# Patient Record
Sex: MALE | Race: BLACK OR AFRICAN AMERICAN | URBAN - METROPOLITAN AREA
[De-identification: names, ages, dates, MRNs, and addresses within clinical notes are randomized per-mention and may not be internally consistent; named-entity substitution may affect disease eponyms.]

---

## 2017-03-21 ENCOUNTER — OFFICE VISIT (OUTPATIENT)
Dept: INTERNAL MEDICINE CLINIC | Age: 57
End: 2017-03-21

## 2017-03-21 ENCOUNTER — DOCUMENTATION ONLY (OUTPATIENT)
Dept: INTERNAL MEDICINE CLINIC | Age: 57
End: 2017-03-21

## 2017-03-21 VITALS
DIASTOLIC BLOOD PRESSURE: 77 MMHG | BODY MASS INDEX: 27.92 KG/M2 | HEART RATE: 100 BPM | SYSTOLIC BLOOD PRESSURE: 110 MMHG | HEIGHT: 70 IN | TEMPERATURE: 99.1 F | WEIGHT: 195 LBS | OXYGEN SATURATION: 97 % | RESPIRATION RATE: 18 BRPM

## 2017-03-21 DIAGNOSIS — G89.29 CHRONIC PAIN OF RIGHT KNEE: ICD-10-CM

## 2017-03-21 DIAGNOSIS — G89.29 CHRONIC NECK PAIN: Primary | ICD-10-CM

## 2017-03-21 DIAGNOSIS — M54.41 CHRONIC BILATERAL LOW BACK PAIN WITH RIGHT-SIDED SCIATICA: ICD-10-CM

## 2017-03-21 DIAGNOSIS — R63.4 WEIGHT LOSS, UNINTENTIONAL: ICD-10-CM

## 2017-03-21 DIAGNOSIS — F17.200 TOBACCO USE DISORDER: ICD-10-CM

## 2017-03-21 DIAGNOSIS — M54.2 CHRONIC NECK PAIN: Primary | ICD-10-CM

## 2017-03-21 DIAGNOSIS — Z12.11 ENCOUNTER FOR SCREENING COLONOSCOPY: ICD-10-CM

## 2017-03-21 DIAGNOSIS — M25.561 CHRONIC PAIN OF RIGHT KNEE: ICD-10-CM

## 2017-03-21 DIAGNOSIS — F43.10 PTSD (POST-TRAUMATIC STRESS DISORDER): ICD-10-CM

## 2017-03-21 DIAGNOSIS — F41.8 DEPRESSION WITH ANXIETY: ICD-10-CM

## 2017-03-21 DIAGNOSIS — G89.29 CHRONIC BILATERAL LOW BACK PAIN WITH RIGHT-SIDED SCIATICA: ICD-10-CM

## 2017-03-21 NOTE — PROGRESS NOTES
History:   Eddie Caicedo is a 55 y.o. male presenting today for an initial visit to establish care. History is significant for asthma, PTSD, depression, anxiety, chronic neck pain and chronic right knee pain. Pt reports a history of incarceration for 12 years. He was released in May of 2016. He was previously followed by his PCP in [facility], Dr. Zoraida Lara. PTSD, depression with anxiety: Pt reports symptoms as stable. He was previously followed by psychiatry while incarcerated. He has taken Celexa and Wellbutrin XL in the past.  He states that he has not taken the medications in 3 years. He reports feelings of hopelessness at times, denies suicidal or homicidal ideation. He states that he smokes 1 ppd for the past 15 years to relieve his anxiety. He is not interested in quitting at this time. He currently consumes 6 cans of beer 3x per week. He has not thought about cutting down his alcohol intake. He sometimes becomes annoyed when his daughter talks to him about his drinking. He denies feelings of guilt or need to drink in the morning. Chronic pain: Pt reports chronic low back pain w/ right-sided sciatica and episodic neck spasms for the past 3 years following an incident in which snow and ice fell from a roof onto his back during his incarceration. He denies upper or lower extremity weakness, numbness/tingling, bowel/bladder dysfunction, or saddle anesthesia. Pt also reports chronic right knee pain due to a meniscal tear several years ago during incarceration. He reports receiving arthroscopic surgery x2. He states he was told that he may need total knee replacement. Asthma: Stable. Currently uses albuterol inhaler approximately once per month. He denies SOB, wheezing, chest pain, or palpitations.        Past Surgical History:   Procedure Laterality Date    HX KNEE ARTHROSCOPY         Social History     Social History    Marital status:      Spouse name: N/A   Verlinda Smoker Number of children: N/A    Years of education: N/A     Occupational History    Not on file. Social History Main Topics    Smoking status: Current Every Day Smoker     Packs/day: 1.00     Years: 15.00    Smokeless tobacco: Never Used    Alcohol use Yes      Comment: social    Drug use: Remote history of cocaine and marijuana use    Sexual activity: No     Other Topics Concern    Not on file     Social History Narrative    No narrative on file       Family History   Problem Relation Age of Onset    Hypertension Mother     Diabetes Mother     Heart Disease Father     Cancer Sister     Cancer Brother     Diabetes Brother        No current outpatient prescriptions on file prior to visit. No current facility-administered medications on file prior to visit. No Known Allergies    Review of Systems   Constitutional: Negative. HENT: Negative. Eyes: Negative. Respiratory: Negative. Cardiovascular: Negative. Gastrointestinal: Negative. Genitourinary: Negative. Musculoskeletal: Positive for back pain, joint pain and neck pain. Negative for falls and myalgias. Skin: Negative. Neurological: Negative. Psychiatric/Behavioral: Positive for depression and substance abuse (remote history reported, denies use in last 3 years). Negative for hallucinations, memory loss and suicidal ideas. The patient is nervous/anxious. The patient does not have insomnia. Objective:   VS:    Visit Vitals    /77 (BP 1 Location: Left arm, BP Patient Position: Sitting)    Pulse 100    Temp 99.1 °F (37.3 °C) (Oral)    Resp 18    Ht 5' 10\" (1.778 m)    Wt 195 lb (88.5 kg)    SpO2 97%    BMI 27.98 kg/m2     Physical Exam   Constitutional: He is oriented to person, place, and time. He appears well-developed and well-nourished. HENT:   Head: Normocephalic and atraumatic.    Right Ear: External ear normal.   Left Ear: External ear normal.   Nose: Nose normal.   Mouth/Throat: Oropharynx is clear and moist.   Eyes: Conjunctivae and EOM are normal. Pupils are equal, round, and reactive to light. Neck: Normal range of motion. Neck supple. Cardiovascular: Normal rate, regular rhythm, normal heart sounds and intact distal pulses. Pulmonary/Chest: Effort normal and breath sounds normal.   Abdominal: Soft. Bowel sounds are normal.   Musculoskeletal: Normal range of motion. He exhibits no edema. Neurological: He is alert and oriented to person, place, and time. Skin: Skin is warm and dry. Psychiatric: He has a normal mood and affect. His behavior is normal.   Nursing note and vitals reviewed. Assessment/ Plan:     Idania Uribe was seen today for spasms and depression. Diagnoses and all orders for this visit:    Chronic neck pain  -     REFERRAL TO PAIN MANAGEMENT    Chronic bilateral low back pain with right-sided sciatica  -     REFERRAL TO PAIN MANAGEMENT    Chronic pain of right knee        -     Will refer to orthopedics for evaluation    PTSD (post-traumatic stress disorder)  -     REFERRAL TO PSYCHIATRY    Depression with anxiety  -     REFERRAL TO PSYCHIATRY    Weight loss, unintentional  -     CBC WITH AUTOMATED DIFF; Future  -     METABOLIC PANEL, COMPREHENSIVE; Future  -     HIV 1/2 AG/AB, 4TH GENERATION,W RFLX CONFIRM; Future  -     AMB POC URINALYSIS DIP STICK AUTO W/ MICRO  -     CBC WITH AUTOMATED DIFF  -     METABOLIC PANEL, COMPREHENSIVE  -     HIV 1/2 AG/AB, 4TH GENERATION,W RFLX CONFIRM    Tobacco use disorder        -     Counseled on smoking cessation    Encounter for screening colonoscopy  -     REFERRAL TO GASTROENTEROLOGY     I have discussed the diagnosis with the patient and the intended plan as seen in the above orders. The patient verbalized understanding and agrees with the plan.       Follow-up Disposition: Not on [facility], [place]

## 2017-03-21 NOTE — PROGRESS NOTES
Chief Complaint   Patient presents with    Spasms     Neck,back and right knee   1. Have you been to the ER, urgent care clinic since your last visit? Hospitalized since your last visit? No    2. Have you seen or consulted any other health care providers outside of the [address] [place] [address] since your last visit? Include any pap smears or colon screening.  No

## 2017-03-22 LAB
ALBUMIN SERPL-MCNC: 4.5 G/DL (ref 3.5–5.5)
ALBUMIN/GLOB SERPL: 1.4 {RATIO} (ref 1.2–2.2)
ALP SERPL-CCNC: 91 IU/L (ref 39–117)
ALT SERPL-CCNC: 17 IU/L (ref 0–44)
AST SERPL-CCNC: 31 IU/L (ref 0–40)
BASOPHILS # BLD AUTO: 0.1 X10E3/UL (ref 0–0.2)
BASOPHILS NFR BLD AUTO: 1 %
BILIRUB SERPL-MCNC: 0.2 MG/DL (ref 0–1.2)
BILIRUB UR QL STRIP: NEGATIVE
BUN SERPL-MCNC: 13 MG/DL (ref 6–24)
BUN/CREAT SERPL: 10 (ref 9–20)
CALCIUM SERPL-MCNC: 9.3 MG/DL (ref 8.7–10.2)
CHLORIDE SERPL-SCNC: 101 MMOL/L (ref 96–106)
CO2 SERPL-SCNC: 22 MMOL/L (ref 18–29)
CREAT SERPL-MCNC: 1.25 MG/DL (ref 0.76–1.27)
EOSINOPHIL # BLD AUTO: 0.2 X10E3/UL (ref 0–0.4)
EOSINOPHIL NFR BLD AUTO: 2 %
ERYTHROCYTE [DISTWIDTH] IN BLOOD BY AUTOMATED COUNT: 14.2 % (ref 12.3–15.4)
GLOBULIN SER CALC-MCNC: 3.2 G/DL (ref 1.5–4.5)
GLUCOSE SERPL-MCNC: 78 MG/DL (ref 65–99)
GLUCOSE UR-MCNC: NEGATIVE MG/DL
HCT VFR BLD AUTO: 45.8 % (ref 37.5–51)
HGB BLD-MCNC: 15.1 G/DL (ref 12.6–17.7)
HIV 1+2 AB+HIV1 P24 AG SERPL QL IA: NON REACTIVE
IMM GRANULOCYTES # BLD: 0 X10E3/UL (ref 0–0.1)
IMM GRANULOCYTES NFR BLD: 0 %
KETONES P FAST UR STRIP-MCNC: NEGATIVE MG/DL
LYMPHOCYTES # BLD AUTO: 3.1 X10E3/UL (ref 0.7–3.1)
LYMPHOCYTES NFR BLD AUTO: 42 %
MCH RBC QN AUTO: 26.9 PG (ref 26.6–33)
MCHC RBC AUTO-ENTMCNC: 33 G/DL (ref 31.5–35.7)
MCV RBC AUTO: 82 FL (ref 79–97)
MONOCYTES # BLD AUTO: 0.5 X10E3/UL (ref 0.1–0.9)
MONOCYTES NFR BLD AUTO: 6 %
NEUTROPHILS # BLD AUTO: 3.6 X10E3/UL (ref 1.4–7)
NEUTROPHILS NFR BLD AUTO: 49 %
PH UR STRIP: 6.5 [PH] (ref 4.6–8)
PLATELET # BLD AUTO: 286 X10E3/UL (ref 150–379)
POTASSIUM SERPL-SCNC: 4.9 MMOL/L (ref 3.5–5.2)
PROT SERPL-MCNC: 7.7 G/DL (ref 6–8.5)
PROT UR QL STRIP: NEGATIVE MG/DL
RBC # BLD AUTO: 5.62 X10E6/UL (ref 4.14–5.8)
SODIUM SERPL-SCNC: 141 MMOL/L (ref 134–144)
SP GR UR STRIP: 1.02 (ref 1–1.03)
UA UROBILINOGEN AMB POC: NORMAL (ref 0.2–1)
URINALYSIS CLARITY POC: CLEAR
URINALYSIS COLOR POC: NORMAL
URINE BLOOD POC: NEGATIVE
URINE LEUKOCYTES POC: NEGATIVE
URINE NITRITES POC: NEGATIVE
WBC # BLD AUTO: 7.4 X10E3/UL (ref 3.4–10.8)

## 2017-03-24 ENCOUNTER — TELEPHONE (OUTPATIENT)
Dept: INTERNAL MEDICINE CLINIC | Age: 57
End: 2017-03-24

## 2017-03-24 NOTE — TELEPHONE ENCOUNTER
Mr Stanley Kemalsergo called to see if him pain medicine had been ordered. He said Dr Cassie Escamilla was awaiting lab results. Looks like results are in. Please use Gerardo at [facility].

## 2017-03-28 ENCOUNTER — TELEPHONE (OUTPATIENT)
Dept: INTERNAL MEDICINE CLINIC | Age: 57
End: 2017-03-28

## 2017-03-28 DIAGNOSIS — M54.41 ACUTE BILATERAL LOW BACK PAIN WITH RIGHT-SIDED SCIATICA: Primary | ICD-10-CM

## 2017-03-28 RX ORDER — DICLOFENAC SODIUM 50 MG/1
50 TABLET, DELAYED RELEASE ORAL 2 TIMES DAILY
Qty: 60 TAB | Refills: 1 | Status: SHIPPED | OUTPATIENT
Start: 2017-03-28 | End: 2017-05-31 | Stop reason: SDUPTHER

## 2017-03-28 RX ORDER — CYCLOBENZAPRINE HCL 10 MG
10 TABLET ORAL
Qty: 42 TAB | Refills: 0 | Status: SHIPPED | OUTPATIENT
Start: 2017-03-28 | End: 2017-05-02 | Stop reason: SDUPTHER

## 2017-03-28 NOTE — TELEPHONE ENCOUNTER
Reviewed labs with patient. Informed pt of normal CBC, CMP, and non reactive HIV screening. Will send flexeril and diclofenac to pharmacy for musculoskeletal pain. Pt will follow up with pain management for chronic back pain. Pt verbally expressed understanding and is in agreement with treatment plan.

## 2017-05-02 DIAGNOSIS — M54.41 ACUTE BILATERAL LOW BACK PAIN WITH RIGHT-SIDED SCIATICA: ICD-10-CM

## 2017-05-03 RX ORDER — CYCLOBENZAPRINE HCL 10 MG
TABLET ORAL
Qty: 42 TAB | Refills: 0 | Status: SHIPPED | OUTPATIENT
Start: 2017-05-03 | End: 2017-05-31 | Stop reason: SDUPTHER

## 2017-06-26 ENCOUNTER — TELEPHONE (OUTPATIENT)
Dept: INTERNAL MEDICINE CLINIC | Age: 57
End: 2017-06-26

## 2017-06-26 NOTE — TELEPHONE ENCOUNTER
Mr Mike Justin called to say Dr Parminder Barrett, Pain Mgmt does not take patient's [employer]. [facility] to see which [facility] doctors are in network and call us back. We will at that time sent referral & notes to that office. He said he would give insurance a call.

## 2017-07-05 DIAGNOSIS — M54.41 ACUTE BILATERAL LOW BACK PAIN WITH RIGHT-SIDED SCIATICA: ICD-10-CM

## 2017-07-05 NOTE — TELEPHONE ENCOUNTER
Mr Graceann Jeans called back and his insurance, [employer]gie Rader has advised him that there is a provider in [place] who is contracted to take this insurance. I have called them and they said it will be about 4 wks before he could be seen and that is after they review his records to see if they will accept him as a patient. They do prescribe at that office. In the meantime, he will need his refills for one more month from [address] [place] [address].     Referral will be sent to:  [facility]  Dr Sathish Abbott 09, [address] [facility], [facility] [address] [place] [address] [phone]  Fax [phone]

## 2017-07-10 RX ORDER — CYCLOBENZAPRINE HCL 10 MG
TABLET ORAL
Qty: 30 TAB | Refills: 0 | Status: SHIPPED | OUTPATIENT
Start: 2017-07-10 | End: 2017-08-01 | Stop reason: SDUPTHER

## 2017-07-10 RX ORDER — DICLOFENAC SODIUM 50 MG/1
TABLET, DELAYED RELEASE ORAL
Qty: 30 TAB | Refills: 0 | Status: SHIPPED | OUTPATIENT
Start: 2017-07-10 | End: 2017-08-01 | Stop reason: SDUPTHER

## 2017-08-01 DIAGNOSIS — M54.41 ACUTE BILATERAL LOW BACK PAIN WITH RIGHT-SIDED SCIATICA: ICD-10-CM

## 2017-08-01 RX ORDER — CYCLOBENZAPRINE HCL 10 MG
TABLET ORAL
Qty: 30 TAB | Refills: 0 | Status: SHIPPED | OUTPATIENT
Start: 2017-08-01

## 2017-08-01 RX ORDER — DICLOFENAC SODIUM 50 MG/1
TABLET, DELAYED RELEASE ORAL
Qty: 30 TAB | Refills: 0 | Status: SHIPPED | OUTPATIENT
Start: 2017-08-01

## 2017-08-01 NOTE — TELEPHONE ENCOUNTER
Mr Leni Peter called to get his Flexeril & Voltaren refilled. We had referred him to Dr Swapna Escalante but he does not accept his [employer]. Patient called insurance and they said [facility] in [place] is in the network. We faxed the referral to them and received our fax back saying there was nothing more they could offer so they never saw Mr Leni Peter. This referral has been scanned to [facility].

## 2017-08-25 NOTE — PROGRESS NOTES
For this DOS the wrong U/A was ordered [id number] is incorrect should have been [id number].   Claim has been corrected in [place] and re-billed to insurance

## 2022-10-15 NOTE — TELEPHONE ENCOUNTER
Attempted to contact patient to review lab results. Pt not available. Left voicemail message for patient to call back to office. Pt vomiting, EDMD notified and aware. Meds given. Will cont to monitor